# Patient Record
Sex: FEMALE | Race: WHITE
[De-identification: names, ages, dates, MRNs, and addresses within clinical notes are randomized per-mention and may not be internally consistent; named-entity substitution may affect disease eponyms.]

---

## 2017-11-15 ENCOUNTER — HOSPITAL ENCOUNTER (OUTPATIENT)
Dept: HOSPITAL 45 - C.LABSPEC | Age: 32
Discharge: HOME | End: 2017-11-15
Attending: PHYSICIAN ASSISTANT
Payer: COMMERCIAL

## 2017-11-15 DIAGNOSIS — J02.9: Primary | ICD-10-CM

## 2017-11-29 ENCOUNTER — HOSPITAL ENCOUNTER (EMERGENCY)
Dept: HOSPITAL 45 - C.EDB | Age: 32
Discharge: HOME | End: 2017-11-29
Payer: COMMERCIAL

## 2017-11-29 VITALS
HEIGHT: 62.01 IN | BODY MASS INDEX: 53.24 KG/M2 | BODY MASS INDEX: 53.24 KG/M2 | WEIGHT: 293 LBS | WEIGHT: 293 LBS | HEIGHT: 62.01 IN

## 2017-11-29 VITALS — OXYGEN SATURATION: 98 %

## 2017-11-29 VITALS — HEART RATE: 91 BPM | DIASTOLIC BLOOD PRESSURE: 81 MMHG | SYSTOLIC BLOOD PRESSURE: 133 MMHG | OXYGEN SATURATION: 97 %

## 2017-11-29 VITALS — TEMPERATURE: 97.7 F

## 2017-11-29 DIAGNOSIS — R19.7: ICD-10-CM

## 2017-11-29 DIAGNOSIS — Z79.899: ICD-10-CM

## 2017-11-29 DIAGNOSIS — R00.2: Primary | ICD-10-CM

## 2017-11-29 DIAGNOSIS — R11.2: ICD-10-CM

## 2017-11-29 DIAGNOSIS — F41.9: ICD-10-CM

## 2017-11-29 DIAGNOSIS — F17.210: ICD-10-CM

## 2017-11-29 LAB
ALP SERPL-CCNC: 87 U/L (ref 45–117)
ALT SERPL-CCNC: 31 U/L (ref 12–78)
ANION GAP SERPL CALC-SCNC: 14 MMOL/L (ref 3–11)
APPEARANCE UR: (no result)
AST SERPL-CCNC: 17 U/L (ref 15–37)
BASOPHILS # BLD: 0.01 K/UL (ref 0–0.2)
BASOPHILS NFR BLD: 0.1 %
BILIRUB UR-MCNC: (no result) MG/DL
BUN SERPL-MCNC: 16 MG/DL (ref 7–18)
BUN/CREAT SERPL: 14 (ref 10–20)
BURR CELLS BLD QL SMEAR: (no result)
CALCIUM SERPL-MCNC: 9.3 MG/DL (ref 8.5–10.1)
CHLORIDE SERPL-SCNC: 108 MMOL/L (ref 98–107)
CKMB/CK RATIO: 1.5 (ref 0–3)
CO2 SERPL-SCNC: 20 MMOL/L (ref 21–32)
COLOR UR: YELLOW
COMPLETE: YES
CREAT CL PREDICTED SERPL C-G-VRATE: 95.4 ML/MIN
CREAT SERPL-MCNC: 1.13 MG/DL (ref 0.6–1.2)
EOSINOPHIL NFR BLD AUTO: 296 K/UL (ref 130–400)
GLUCOSE SERPL-MCNC: 145 MG/DL (ref 70–99)
HCT VFR BLD CALC: 41.1 % (ref 37–47)
IG%: 1.1 %
IMM GRANULOCYTES NFR BLD AUTO: 19.7 %
LYMPHOCYTES # BLD: 3.61 K/UL (ref 1.2–3.4)
MAGNESIUM SERPL-MCNC: 1.9 MG/DL (ref 1.8–2.4)
MANUAL MICROSCOPIC REQUIRED?: NO
MCH RBC QN AUTO: 33.5 PG (ref 25–34)
MCHC RBC AUTO-ENTMCNC: 34.1 G/DL (ref 32–36)
MCV RBC AUTO: 98.3 FL (ref 80–100)
MONOCYTES NFR BLD: 2.6 %
NEUTROPHILS # BLD AUTO: 0.5 %
NEUTROPHILS NFR BLD AUTO: 76 %
NITRITE UR QL STRIP: (no result)
PH UR STRIP: 5 [PH] (ref 4.5–7.5)
PMV BLD AUTO: 11.3 FL (ref 7.4–10.4)
POTASSIUM SERPL-SCNC: 3.3 MMOL/L (ref 3.5–5.1)
PREG INTERNAL NEGATIVE QC: (no result)
PREG INTERNAL POSITIVE QC: (no result)
RBC # BLD AUTO: 4.18 M/UL (ref 4.2–5.4)
REVIEW REQ?: YES
SODIUM SERPL-SCNC: 142 MMOL/L (ref 136–145)
SP GR UR STRIP: 1.02 (ref 1–1.03)
URINE BILL WITH OR WITHOUT MIC: (no result)
URINE EPITHELIAL CELL AUTO: >30 /LPF (ref 0–5)
URINE PATH CASTS: (no result) /LPF
UROBILINOGEN UR-MCNC: (no result) MG/DL
WBC # BLD AUTO: 18.35 K/UL (ref 4.8–10.8)

## 2017-11-29 NOTE — DIAGNOSTIC IMAGING REPORT
ABDOMEN 2VIEW W/PA CHEST RTN



HISTORY:  32 years-old Female ABDOMINAL PAIN/GI acute generalized abdominal pain



COMPARISON: None available



TECHNIQUE: PA view of the chest with erect and supine views of the abdomen



FINDINGS: 

Cardiomediastinal and hilar silhouettes are within normal limits. There is no

pneumothorax, pleural effusion, focal airspace consolidation or overt pulmonary

edema. The bones of the chest are grossly intact.



There is no pneumoperitoneum on the upright projection. The bowel gas pattern is

nonobstructive. No urolith. Probable phlebolith of the pelvis.



IMPRESSION: 

1. No acute cardiopulmonary process.

2. Nonobstructive bowel gas pattern.

3. No pneumoperitoneum. 







The above report was generated using voice recognition software. It may contain

grammatical, syntax or spelling errors.







Electronically signed by:  Zachariah Stevens M.D.

11/29/2017 6:42 PM



Dictated Date/Time:  11/29/2017 6:41 PM

## 2017-12-01 ENCOUNTER — HOSPITAL ENCOUNTER (EMERGENCY)
Dept: HOSPITAL 45 - C.EDB | Age: 32
Discharge: HOME | End: 2017-12-01
Payer: COMMERCIAL

## 2017-12-01 VITALS
HEIGHT: 62.01 IN | WEIGHT: 293 LBS | BODY MASS INDEX: 53.24 KG/M2 | WEIGHT: 293 LBS | HEIGHT: 62.01 IN | BODY MASS INDEX: 53.24 KG/M2

## 2017-12-01 VITALS — OXYGEN SATURATION: 98 % | DIASTOLIC BLOOD PRESSURE: 74 MMHG | HEART RATE: 103 BPM | SYSTOLIC BLOOD PRESSURE: 131 MMHG

## 2017-12-01 VITALS — TEMPERATURE: 98.6 F

## 2017-12-01 DIAGNOSIS — R00.2: ICD-10-CM

## 2017-12-01 DIAGNOSIS — R19.7: Primary | ICD-10-CM

## 2017-12-01 DIAGNOSIS — Z80.9: ICD-10-CM

## 2017-12-01 DIAGNOSIS — F41.9: ICD-10-CM

## 2017-12-01 DIAGNOSIS — Z79.899: ICD-10-CM

## 2017-12-01 DIAGNOSIS — Z83.3: ICD-10-CM

## 2017-12-01 DIAGNOSIS — Z84.1: ICD-10-CM

## 2017-12-01 DIAGNOSIS — F17.210: ICD-10-CM

## 2017-12-01 LAB
ALP SERPL-CCNC: 77 U/L (ref 45–117)
ALT SERPL-CCNC: 26 U/L (ref 12–78)
ANION GAP SERPL CALC-SCNC: 6 MMOL/L (ref 3–11)
APPEARANCE UR: CLEAR
AST SERPL-CCNC: 9 U/L (ref 15–37)
BASOPHILS # BLD: 0.01 K/UL (ref 0–0.2)
BASOPHILS NFR BLD: 0.1 %
BILIRUB UR-MCNC: (no result) MG/DL
BUN SERPL-MCNC: 7 MG/DL (ref 7–18)
BUN/CREAT SERPL: 8.2 (ref 10–20)
CALCIUM SERPL-MCNC: 8.9 MG/DL (ref 8.5–10.1)
CHLORIDE SERPL-SCNC: 109 MMOL/L (ref 98–107)
CO2 SERPL-SCNC: 26 MMOL/L (ref 21–32)
COLOR UR: YELLOW
COMPLETE: YES
CREAT CL PREDICTED SERPL C-G-VRATE: 119.9 ML/MIN
CREAT SERPL-MCNC: 0.89 MG/DL (ref 0.6–1.2)
EOSINOPHIL NFR BLD AUTO: 251 K/UL (ref 130–400)
GLUCOSE SERPL-MCNC: 97 MG/DL (ref 70–99)
HCT VFR BLD CALC: 37.6 % (ref 37–47)
IG%: 0.2 %
IMM GRANULOCYTES NFR BLD AUTO: 11.4 %
LYME DISEASE AB IGG: (no result)
LYME DISEASE AB IGM: (no result)
LYMPHOCYTES # BLD: 1.97 K/UL (ref 1.2–3.4)
MAGNESIUM SERPL-MCNC: 2 MG/DL (ref 1.8–2.4)
MANUAL MICROSCOPIC REQUIRED?: NO
MCH RBC QN AUTO: 33.2 PG (ref 25–34)
MCHC RBC AUTO-ENTMCNC: 33.5 G/DL (ref 32–36)
MCV RBC AUTO: 99.2 FL (ref 80–100)
MONOCYTES NFR BLD: 9.5 %
NEUTROPHILS # BLD AUTO: 0.3 %
NEUTROPHILS NFR BLD AUTO: 78.5 %
NITRITE UR QL STRIP: (no result)
PH UR STRIP: 7 [PH] (ref 4.5–7.5)
PMV BLD AUTO: 10.9 FL (ref 7.4–10.4)
POTASSIUM SERPL-SCNC: 3.5 MMOL/L (ref 3.5–5.1)
RBC # BLD AUTO: 3.79 M/UL (ref 4.2–5.4)
REVIEW REQ?: NO
SODIUM SERPL-SCNC: 141 MMOL/L (ref 136–145)
SP GR UR STRIP: 1.01 (ref 1–1.03)
TSH SERPL-ACNC: 1.21 UIU/ML (ref 0.3–4.5)
URINE BILL WITH OR WITHOUT MIC: (no result)
UROBILINOGEN UR-MCNC: (no result) MG/DL
WBC # BLD AUTO: 17.33 K/UL (ref 4.8–10.8)

## 2017-12-01 NOTE — EMERGENCY ROOM VISIT NOTE
History


Report prepared by Matthew:  Fritz Hong


Under the Supervision of:  Dr. Juan Avila M.D.


First contact with patient:  09:34


Chief Complaint:  REFERRED BY DOCTOR


Stated Complaint:  SENT BY PCP





History of Present Illness


The patient is a 32 year old female who presents to the Emergency Room with 

complaints of multiple episodes of vomiting that began two days ago. She was 

seen in the ER at that time for nausea, vomiting, hot flashes, chills, diarrhea

, and an increased heart rate. Her laboratory results showed an elevated white 

blood cell count. She was discharged with a follow up appointment with her PCP. 

Although she feels mildly better, her symptoms have persisted since. She has 

been eating but is having a hard time keeping up with her fluids. She also 

notes that she has been experience a strange itching sensation to the palms of 

her hands and bottoms of her feet. She was taken off of Lexapro three days ago 

and has not taken any since then. She has a past medical history of a pilonidal 

cyst surgery. Pt denies LOC, headache, fevers, visual changes, neck pain, chest 

pain, breathing difficulties, abdominal pain, back pain, melena, hematochezia, 

urinary symptoms, numbness, weakness, lymphadenopathy, rash, or other 

complaints. About 1.5 weeks ago, she was treated for strep throat with 

Clindamycin.





   Source of History:  patient


   Onset:  2 days ago


   Position:  other (GI)


   Symptom Intensity:  Multiple episodes


   Quality:  other (Vomiting)


   Timing:  intermittent


   Associated Symptoms:  + chills, + nausea, + diarrhea


Note:


She is also having itching to her hands and feet with an elevated heart rate. 

She notes that she has intermittent hot flashes.





Review of Systems


See HPI for pertinent positives and negatives.  A total of ten systems were 

reviewed and were otherwise negative.





Past Medical & Surgical


Medical Problems:


(1) Anxiety


(2) Pilonidal cyst








Family History





Cancer


Diabetes mellitus


Heart disease


Kidney disease


Kidney stones





Social History


Smoking Status:  Current Every Day Smoker


Smokeless Tobacco Use:  No


Alcohol Use:  occasionally


Drug Use:  none


Marital Status:  


Housing Status:  lives with family


Occupation Status:  employed





Current/Historical Medications


Scheduled


Clindamycin HCl (Clindamycin HCl), 1 CAP PO QID


Escitalopram (Lexapro), 10 MG PO DAILY





Scheduled PRN


Ondansetron Hcl (Zofran), 4 MG PO Q6H PRN for Nausea





Miscellaneous Medications


Fluticasone Propionate (Nasal) (Flonase Allergy Relief)





Allergies


Coded Allergies:  


     Amoxicillin (Unverified  Allergy, Mild, facial swelling, 12/1/17)


     Clavulanic Acid (Unverified  Allergy, Mild, facial swelling, 12/1/17)





Physical Exam


Vital Signs











  Date Time  Temp Pulse Resp B/P (MAP) Pulse Ox O2 Delivery O2 Flow Rate FiO2


 


12/1/17 15:00  103 18 131/74 98 Room Air  


 


12/1/17 14:20  100      


 


12/1/17 13:11  100 18 126/64 98 Room Air  


 


12/1/17 10:47  92 18 160/101 98 Room Air  


 


12/1/17 09:57  96      


 


12/1/17 09:56  93  129/91    





  104  144/96    





  106  143/89    


 


12/1/17 09:23 37.0 105 20 147/83 95 Room Air  











Physical Exam


GENERAL: Awake, alert, well-appearing, in no distress


HENT: Normocephalic, atraumatic. Oropharynx unremarkable.


EYES: Normal conjunctiva. Sclera non-icteric.


NECK: Supple. No nuchal rigidity. FROM. No JVD.


RESPIRATORY: Clear to auscultation.


CARDIAC: Regular rate, normal rhythm. Extremities warm and well perfused. 

Pulses equal.


ABDOMEN: Soft, non-distended. No tenderness to palpation. No rebound or 

guarding. No masses.


RECTAL: Deferred.


MUSCULOSKELETAL: Chest examination reveals no tenderness. The back is 

symmetrical on inspection without obvious abnormality. There is no CVA 

tenderness to palpation. No joint edema. 


LOWER EXTREMITIES: Calves are equal size bilaterally and non-tender. No edema. 

No discoloration. 


NEURO: Normal sensorium. No sensory or motor deficits noted. 


SKIN: No rash or jaundice noted.





Medical Decision & Procedures


Laboratory Results


12/1/17 09:45








Red Blood Count 3.79, Mean Corpuscular Volume 99.2, Mean Corpuscular Hemoglobin 

33.2, Mean Corpuscular Hemoglobin Concent 33.5, Mean Platelet Volume 10.9, 

Neutrophils (%) (Auto) 78.5, Lymphocytes (%) (Auto) 11.4, Monocytes (%) (Auto) 

9.5, Eosinophils (%) (Auto) 0.3, Basophils (%) (Auto) 0.1, Neutrophils # (Auto) 

13.61, Lymphocytes # (Auto) 1.97, Monocytes # (Auto) 1.64, Eosinophils # (Auto) 

0.06, Basophils # (Auto) 0.01





12/1/17 09:45

















Test


  12/1/17


09:45 12/1/17


10:50


 


White Blood Count


  17.33 K/uL


(4.8-10.8) 


 


 


Red Blood Count


  3.79 M/uL


(4.2-5.4) 


 


 


Hemoglobin


  12.6 g/dL


(12.0-16.0) 


 


 


Hematocrit 37.6 % (37-47)  


 


Mean Corpuscular Volume


  99.2 fL


() 


 


 


Mean Corpuscular Hemoglobin


  33.2 pg


(25-34) 


 


 


Mean Corpuscular Hemoglobin


Concent 33.5 g/dl


(32-36) 


 


 


Platelet Count


  251 K/uL


(130-400) 


 


 


Mean Platelet Volume


  10.9 fL


(7.4-10.4) 


 


 


Neutrophils (%) (Auto) 78.5 %  


 


Lymphocytes (%) (Auto) 11.4 %  


 


Monocytes (%) (Auto) 9.5 %  


 


Eosinophils (%) (Auto) 0.3 %  


 


Basophils (%) (Auto) 0.1 %  


 


Neutrophils # (Auto)


  13.61 K/uL


(1.4-6.5) 


 


 


Lymphocytes # (Auto)


  1.97 K/uL


(1.2-3.4) 


 


 


Monocytes # (Auto)


  1.64 K/uL


(0.11-0.59) 


 


 


Eosinophils # (Auto)


  0.06 K/uL


(0-0.5) 


 


 


Basophils # (Auto)


  0.01 K/uL


(0-0.2) 


 


 


RDW Standard Deviation


  46.7 fL


(36.4-46.3) 


 


 


RDW Coefficient of Variation


  12.9 %


(11.5-14.5) 


 


 


Immature Granulocyte % (Auto) 0.2 %  


 


Immature Granulocyte # (Auto)


  0.04 K/uL


(0.00-0.02) 


 


 


Anion Gap


  6.0 mmol/L


(3-11) 


 


 


Est Creatinine Clear Calc


Drug Dose 119.9 ml/min 


  


 


 


Estimated GFR (


American) 99.4 


  


 


 


Estimated GFR (Non-


American 85.8 


  


 


 


BUN/Creatinine Ratio 8.2 (10-20)  


 


Calcium Level


  8.9 mg/dl


(8.5-10.1) 


 


 


Magnesium Level


  2.0 mg/dl


(1.8-2.4) 


 


 


Total Bilirubin


  0.6 mg/dl


(0.2-1) 


 


 


Direct Bilirubin


  0.1 mg/dl


(0-0.2) 


 


 


Aspartate Amino Transf


(AST/SGOT) 9 U/L (15-37) 


  


 


 


Alanine Aminotransferase


(ALT/SGPT) 26 U/L (12-78) 


  


 


 


Alkaline Phosphatase


  77 U/L


() 


 


 


Troponin I


  < 0.015 ng/ml


(0-0.045) 


 


 


Total Protein


  7.0 gm/dl


(6.4-8.2) 


 


 


Albumin


  3.4 gm/dl


(3.4-5.0) 


 


 


Lipase


  94 U/L


() 


 


 


Thyroid Stimulating Hormone


(TSH) 1.210 uIu/ml


(0.300-4.500) 


 


 


Lyme Disease IgG Antibody NEG (NEG)  


 


Lyme Disease IgM Antibody NEG (NEG)  


 


Urine Color  YELLOW 


 


Urine Appearance  CLEAR (CLEAR) 


 


Urine pH  7.0 (4.5-7.5) 


 


Urine Specific Gravity


  


  1.010


(1.000-1.030)


 


Urine Protein  NEG (NEG) 


 


Urine Glucose (UA)  NEG (NEG) 


 


Urine Ketones  NEG (NEG) 


 


Urine Occult Blood  NEG (NEG) 


 


Urine Nitrite  NEG (NEG) 


 


Urine Bilirubin  NEG (NEG) 


 


Urine Urobilinogen  NEG (NEG) 


 


Urine Leukocyte Esterase  NEG (NEG) 














 Date/Time


Source Procedure


Growth Status


 


 


 12/1/17 10:55


Stool C.difficile Toxin B Gene (PCR) - Final


No C. difficile toxin B gene detected Complete





Laboratory results reviewed by me





Medications Administered











 Medications


  (Trade)  Dose


 Ordered  Sig/Stacia


 Route  Start Time


 Stop Time Status Last Admin


Dose Admin


 


 Sodium Chloride  1,000 ml @ 


 999 mls/hr  Q1H1M STAT


 IV  12/1/17 09:46


 12/1/17 10:46 DC 12/1/17 10:01


999 MLS/HR


 


 Ondansetron HCl


  (Zofran Inj)  4 mg  NOW  STAT


 IV  12/1/17 09:46


 12/1/17 09:49 DC 12/1/17 10:01


4 MG











ECG


Indication:  nausea, vomiting


Rate (beats per minute):  100


Rhythm:  normal sinus


Findings:  no acute ischemic change, no ectopy





ED Course


0934: The patient was evaluated in room C1B. A complete history and physical 

exam was performed.





0946: Ordered Zofran Inj 4 mg IV, Sodium Chloride 1000 ml @ 999 mls/hr IV





1520: I reevaluated the patient. Discussed results and discharge instructions: 

She verbalized understanding and agreement. The patient is ready for discharge.





Medical Decision


Prior records/ancillary studies reviewed.





Triage Nursing notes reviewed and agree them.


The patient's history was concerning for nausea, vomiting, diarrhea, and 

flushing.





Differential diagnosis:


Etiologies such as infectious diarrhea, C. difficile infection, gastroenteritis

, food borne illness, infections, appendicitis, diverticulitis, inflammatory 

bowel disease, GI bleed, biliary pathology, carcinoid syndrome, medication side 

effect, as well as others were entertained.  





Physical examination findings:


As above.  Borderline tachycardia but the patient looks well.





ER treatment provided:


IV hydration 1 L NSS.  


Zofran 4 mg IV


On reassessment the patient felt better.  Patient was tolerating p.o. intake.





Diagnostics interpretation by me:


ECG: Normal.





The labs revealed a moderate leukocytosis but decreased from prior CBCs.  

Electrolytes unremarkable.  Lyme titer negative.  TSH negative.  Magnesium, 

LFTs and lipase negative.  Stool study revealed no evidence of C. difficile.  

Stool culture pending.





Imaging studies:


Deferred as the patient has no pain.





The patient is doing well.  She has some borderline elevated heart rates.  She 

is not orthostatic.  She does not have any hives or the findings to consider 

allergic reaction.  She had vomiting and diarrhea which would fit with an 

enteritis type of syndrome.  Reaction to the recently started Lexapro would be 

unlikely and she has not taken any in 3 days.  Carcinoid issues would also be 

unlikely given the history.  She can follow up with her primary for 24 hour 

urine testing if symptoms do not continue to improve and resolve.  The patient 

has no abdominal pain or hematochezia.  CT imaging was felt to be unnecessary 

and I did discuss this with the patient.  She was in agreement.  She was 

recently on clindamycin and therefore stool was tested for C. difficile and was 

negative.  The patient was observed in the emergency department for many hours 

and had no dysrhythmias.  I discussed conservative management with her urine she

'll follow-up closely as an outpatient.  The patient was in agreement.I gave my 

usual and customary discussion regarding this issue.  If she worsens in any way 

she will be back.


By the evaluation outlined above emergent etiologies such as appendicitis, 

diverticulitis, mesenteric ischemia, aortic pathology, inflammatory bowel 

disease, renal colic, PUD, biliary pathology, UTI, as well as others were 

deemed relatively unlikely.  





The patient was informed about the findings as listed above.  All questions 

were answered and she was pleased with the treatment.  Return instructions were 

outlined and the patient was discharged in stable condition.  








Referral:


The patient was referred to her primary care physician for follow-up in 3 days 

for a recheck of the current condition.





Medication Reconcilliation


Current Medication List:  was personally reviewed by me





Blood Pressure Screening


Patient's blood pressure:  Elevated blood pressure


Blood pressure disposition:  Elevated BP felt to be situational





Impression





 Primary Impression:  


 Diarrheal disease


 Additional Impressions:  


 Palpitations


 Facial flushing





Scribe Attestation


The scribe's documentation has been prepared under my direction and personally 

reviewed by me in its entirety. I confirm that the note above accurately 

reflects all work, treatment, procedures, and medical decision making performed 

by me.





Departure Information


Dispostion


Home / Self-Care





Referrals


Mariaelena Roberts .JOLANTA (PCP)





Forms


HOME CARE DOCUMENTATION FORM,                                                 

               IMPORTANT VISIT INFORMATION, WORK / SCHOOL INSTRUCTIONS





Patient Instructions


My [facility]





Additional Instructions





Follow-up with your primary physician on Monday.  If you're still experiencing 

the flushing sensations and diarrhea discuss further testing regarding 

carcinoid like syndrome with her.  Testing could start with 24 hour monitoring 

of urine serotonin excretion.  Imaging may be necessary.





Continue current medication but do not use any additional Lexapro.





Return to the ER for worsening flushing/diarrhea, passing out, abdominal pain, 

vomiting, fevers, bloody stools, or as needed.





Problem Qualifiers

## 2018-01-22 ENCOUNTER — HOSPITAL ENCOUNTER (OUTPATIENT)
Dept: HOSPITAL 45 - C.LABSPEC | Age: 33
Discharge: HOME | End: 2018-01-22
Attending: FAMILY MEDICINE
Payer: COMMERCIAL

## 2018-01-22 DIAGNOSIS — J02.9: Primary | ICD-10-CM

## 2018-05-22 ENCOUNTER — HOSPITAL ENCOUNTER (OUTPATIENT)
Dept: HOSPITAL 45 - C.LAB1850 | Age: 33
Discharge: HOME | End: 2018-05-22
Attending: PHYSICIAN ASSISTANT
Payer: COMMERCIAL

## 2018-05-22 DIAGNOSIS — R82.99: Primary | ICD-10-CM

## 2018-08-06 ENCOUNTER — HOSPITAL ENCOUNTER (OUTPATIENT)
Dept: HOSPITAL 45 - C.LAB1850 | Age: 33
Discharge: HOME | End: 2018-08-06
Attending: FAMILY MEDICINE
Payer: COMMERCIAL

## 2018-08-06 DIAGNOSIS — R55: Primary | ICD-10-CM

## 2018-08-06 DIAGNOSIS — R42: ICD-10-CM

## 2018-08-06 LAB
BASOPHILS # BLD: 0.02 K/UL (ref 0–0.2)
BASOPHILS NFR BLD: 0.2 %
BUN SERPL-MCNC: 16 MG/DL (ref 7–18)
CALCIUM SERPL-MCNC: 9 MG/DL (ref 8.5–10.1)
CO2 SERPL-SCNC: 27 MMOL/L (ref 21–32)
CREAT SERPL-MCNC: 0.8 MG/DL (ref 0.6–1.2)
EOS ABS #: 0.24 K/UL (ref 0–0.5)
EOSINOPHIL NFR BLD AUTO: 245 K/UL (ref 130–400)
GLUCOSE SERPL-MCNC: 80 MG/DL (ref 70–99)
HCT VFR BLD CALC: 43.7 % (ref 37–47)
HGB BLD-MCNC: 15 G/DL (ref 12–16)
IG#: 0.02 K/UL (ref 0–0.02)
IMM GRANULOCYTES NFR BLD AUTO: 36.1 %
LYMPHOCYTES # BLD: 3.64 K/UL (ref 1.2–3.4)
MCH RBC QN AUTO: 33.4 PG (ref 25–34)
MCHC RBC AUTO-ENTMCNC: 34.3 G/DL (ref 32–36)
MCV RBC AUTO: 97.3 FL (ref 80–100)
MONO ABS #: 1.06 K/UL (ref 0.11–0.59)
MONOCYTES NFR BLD: 10.5 %
NEUT ABS #: 5.09 K/UL (ref 1.4–6.5)
NEUTROPHILS # BLD AUTO: 2.4 %
NEUTROPHILS NFR BLD AUTO: 50.6 %
PMV BLD AUTO: 11.5 FL (ref 7.4–10.4)
POTASSIUM SERPL-SCNC: 4 MMOL/L (ref 3.5–5.1)
RED CELL DISTRIBUTION WIDTH CV: 12.7 % (ref 11.5–14.5)
RED CELL DISTRIBUTION WIDTH SD: 44.7 FL (ref 36.4–46.3)
SODIUM SERPL-SCNC: 142 MMOL/L (ref 136–145)
WBC # BLD AUTO: 10.07 K/UL (ref 4.8–10.8)

## 2018-08-07 LAB — HBA1C MFR BLD: 5.3 % (ref 4.5–5.6)
